# Patient Record
(demographics unavailable — no encounter records)

---

## 2024-12-26 NOTE — ASSESSMENT
[Levothyroxine] : The patient was instructed to take Levothyroxine on an empty stomach, separate from vitamins, and wait at least 30 minutes before eating [FreeTextEntry1] : 1) Hypothyroidism secondary to the radioactive iodine ablation for treatment of Graves' disease - to repeat labs Discussed new medication Tepezza  for Graves eye disease; she had discussed with her eye doctor as well will consider..  2) Hyperlipidemia -she has been noncompliant with her cholesterol medication so she promises she will take it daily at this time.Lipitor 20 mg.  3)Left Ptosis -She plans to followup with the neurologist and neuroophthalmologist. thyroid antibodies are negative 4) IGT - Her A1c had improved from 6.3 to 6.0 to 5.7 now 6.1. . . Reviewed reviewed diet again and she will be more careful with her carbohydrate intake and exercise.To see eye doctor since new medication available for Graves eye disease.  5) Nephrolithiasis - asymptomatic at this time but she will be consulting with a urologist, Calcium was 10.6 but corrected for albumin was 10.0. She does have a mildly elevated PTH. Due for a BD.  f/u 6 months

## 2024-12-26 NOTE — HISTORY OF PRESENT ILLNESS
[FreeTextEntry1] : Patient with a history of Graves' disease and  hypothyroidism due to treatment in 2000 with radioactive iodine. She was also found to have impaired glucose tolerance  A1c was  6.2 and with diet and weight loss went to 5.7 now 6.1.. . She does have a history of Graves eye disease. She recently has developed drooping of her left eyelid and was given a possible diagnosis of myasthenia gravis. She saw her nephrologist for hematuria and was found to have kidney stones. It is recommended that she see a urologist. There was also an adnexal mass on a CAT scan of the abdomen and did f/u with her gynecologist.

## 2024-12-26 NOTE — PHYSICAL EXAM
[Alert] : alert [No Acute Distress] : no acute distress [Normal Voice/Communication] : normal voice communication [Normal Sclera/Conjunctiva] : normal sclera/conjunctiva [Thyroid Not Enlarged] : the thyroid was not enlarged [No Respiratory Distress] : no respiratory distress [Clear to Auscultation] : lungs were clear to auscultation bilaterally [Normal PMI] : the apical impulse was normal [Normal Rate] : heart rate was normal [No Edema] : no peripheral edema [Normal Bowel Sounds] : normal bowel sounds [Soft] : abdomen soft [No CVA Tenderness] : no ~M costovertebral angle tenderness [Normal Gait] : normal gait [No Joint Swelling] : no joint swelling seen [Oriented x3] : oriented to person, place, and time [Normal Affect] : the affect was normal [Normal Insight/Judgement] : insight and judgment were intact [de-identified] : + proptosis, with drooping left eyelid